# Patient Record
Sex: MALE | Race: BLACK OR AFRICAN AMERICAN | NOT HISPANIC OR LATINO | URBAN - METROPOLITAN AREA
[De-identification: names, ages, dates, MRNs, and addresses within clinical notes are randomized per-mention and may not be internally consistent; named-entity substitution may affect disease eponyms.]

---

## 2023-08-19 ENCOUNTER — HOSPITAL ENCOUNTER (EMERGENCY)
Facility: HOSPITAL | Age: 68
Discharge: HOME | End: 2023-08-20
Attending: EMERGENCY MEDICINE
Payer: MEDICAID

## 2023-08-19 DIAGNOSIS — U07.1 COVID-19: Primary | ICD-10-CM

## 2023-08-19 DIAGNOSIS — I10 HYPERTENSION: ICD-10-CM

## 2023-08-19 PROCEDURE — 96375 TX/PRO/DX INJ NEW DRUG ADDON: CPT | Mod: ER

## 2023-08-19 PROCEDURE — 96374 THER/PROPH/DIAG INJ IV PUSH: CPT | Mod: ER

## 2023-08-19 PROCEDURE — 84484 ASSAY OF TROPONIN QUANT: CPT | Mod: ER

## 2023-08-19 PROCEDURE — 87804 INFLUENZA ASSAY W/OPTIC: CPT | Mod: 59,ER

## 2023-08-19 PROCEDURE — 83880 ASSAY OF NATRIURETIC PEPTIDE: CPT | Mod: ER

## 2023-08-19 PROCEDURE — 85025 COMPLETE CBC W/AUTO DIFF WBC: CPT | Mod: ER

## 2023-08-19 PROCEDURE — 80053 COMPREHEN METABOLIC PANEL: CPT | Mod: ER

## 2023-08-19 PROCEDURE — 25000003 PHARM REV CODE 250: Mod: ER | Performed by: EMERGENCY MEDICINE

## 2023-08-19 PROCEDURE — 99285 EMERGENCY DEPT VISIT HI MDM: CPT | Mod: ER

## 2023-08-19 RX ORDER — ACETAMINOPHEN 500 MG
1000 TABLET ORAL
Status: COMPLETED | OUTPATIENT
Start: 2023-08-19 | End: 2023-08-19

## 2023-08-19 RX ORDER — LABETALOL HYDROCHLORIDE 5 MG/ML
10 INJECTION, SOLUTION INTRAVENOUS
Status: DISCONTINUED | OUTPATIENT
Start: 2023-08-20 | End: 2023-08-20

## 2023-08-19 RX ADMIN — ACETAMINOPHEN 1000 MG: 500 TABLET, FILM COATED ORAL at 11:08

## 2023-08-20 VITALS
DIASTOLIC BLOOD PRESSURE: 79 MMHG | WEIGHT: 170 LBS | OXYGEN SATURATION: 99 % | TEMPERATURE: 98 F | HEART RATE: 62 BPM | SYSTOLIC BLOOD PRESSURE: 161 MMHG | RESPIRATION RATE: 20 BRPM

## 2023-08-20 LAB
ALBUMIN SERPL-MCNC: 3.5 G/DL (ref 3.3–5.5)
ALP SERPL-CCNC: 73 U/L (ref 42–141)
BILIRUB SERPL-MCNC: 0.4 MG/DL (ref 0.2–1.6)
BILIRUBIN, POC UA: NEGATIVE
BLOOD, POC UA: ABNORMAL
BUN SERPL-MCNC: 8 MG/DL (ref 7–22)
CALCIUM SERPL-MCNC: 9.7 MG/DL (ref 8–10.3)
CHLORIDE SERPL-SCNC: 102 MMOL/L (ref 98–108)
CLARITY, POC UA: CLEAR
COLOR, POC UA: YELLOW
CREAT SERPL-MCNC: 0.8 MG/DL (ref 0.6–1.2)
CTP QC/QA: YES
GLUCOSE SERPL-MCNC: 105 MG/DL (ref 73–118)
GLUCOSE, POC UA: NEGATIVE
INFLUENZA A ANTIGEN, POC: NEGATIVE
INFLUENZA B ANTIGEN, POC: NEGATIVE
KETONES, POC UA: NEGATIVE
LEUKOCYTE EST, POC UA: NEGATIVE
NITRITE, POC UA: NEGATIVE
PH UR STRIP: 6.5 [PH]
POC ALT (SGPT): 18 U/L (ref 10–47)
POC AST (SGOT): 30 U/L (ref 11–38)
POC B-TYPE NATRIURETIC PEPTIDE: 53.6 PG/ML (ref 0–100)
POC CARDIAC TROPONIN I: 0 NG/ML (ref 0–0.08)
POC TCO2: 26 MMOL/L (ref 18–33)
POTASSIUM BLD-SCNC: 4.9 MMOL/L (ref 3.6–5.1)
PROTEIN, POC UA: NEGATIVE
PROTEIN, POC: 7.7 G/DL (ref 6.4–8.1)
SAMPLE: NORMAL
SARS-COV-2 RDRP RESP QL NAA+PROBE: POSITIVE
SODIUM BLD-SCNC: 142 MMOL/L (ref 128–145)
SPECIFIC GRAVITY, POC UA: 1.01
UROBILINOGEN, POC UA: 0.2 E.U./DL

## 2023-08-20 PROCEDURE — 93010 ELECTROCARDIOGRAM REPORT: CPT | Mod: ,,, | Performed by: INTERNAL MEDICINE

## 2023-08-20 PROCEDURE — 63600175 PHARM REV CODE 636 W HCPCS: Mod: ER | Performed by: EMERGENCY MEDICINE

## 2023-08-20 PROCEDURE — 87635 SARS-COV-2 COVID-19 AMP PRB: CPT | Mod: ER | Performed by: EMERGENCY MEDICINE

## 2023-08-20 PROCEDURE — 93010 EKG 12-LEAD: ICD-10-PCS | Mod: ,,, | Performed by: INTERNAL MEDICINE

## 2023-08-20 PROCEDURE — 93005 ELECTROCARDIOGRAM TRACING: CPT | Mod: ER

## 2023-08-20 PROCEDURE — 25000003 PHARM REV CODE 250: Mod: ER | Performed by: EMERGENCY MEDICINE

## 2023-08-20 RX ORDER — ONDANSETRON 2 MG/ML
4 INJECTION INTRAMUSCULAR; INTRAVENOUS
Status: COMPLETED | OUTPATIENT
Start: 2023-08-20 | End: 2023-08-20

## 2023-08-20 RX ORDER — BUTALBITAL, ACETAMINOPHEN AND CAFFEINE 50; 325; 40 MG/1; MG/1; MG/1
1 TABLET ORAL
Status: COMPLETED | OUTPATIENT
Start: 2023-08-20 | End: 2023-08-20

## 2023-08-20 RX ORDER — SODIUM CHLORIDE 9 MG/ML
500 INJECTION, SOLUTION INTRAVENOUS
Status: COMPLETED | OUTPATIENT
Start: 2023-08-20 | End: 2023-08-20

## 2023-08-20 RX ORDER — IBUPROFEN 600 MG/1
600 TABLET ORAL EVERY 6 HOURS PRN
Qty: 20 TABLET | Refills: 0 | Status: SHIPPED | OUTPATIENT
Start: 2023-08-20

## 2023-08-20 RX ORDER — KETOROLAC TROMETHAMINE 30 MG/ML
15 INJECTION, SOLUTION INTRAMUSCULAR; INTRAVENOUS
Status: COMPLETED | OUTPATIENT
Start: 2023-08-20 | End: 2023-08-20

## 2023-08-20 RX ORDER — AZITHROMYCIN 250 MG/1
250 TABLET, FILM COATED ORAL DAILY
Qty: 6 TABLET | Refills: 0 | Status: SHIPPED | OUTPATIENT
Start: 2023-08-20

## 2023-08-20 RX ORDER — AMOXICILLIN AND CLAVULANATE POTASSIUM 875; 125 MG/1; MG/1
1 TABLET, FILM COATED ORAL 2 TIMES DAILY
Qty: 14 TABLET | Refills: 0 | Status: SHIPPED | OUTPATIENT
Start: 2023-08-20

## 2023-08-20 RX ORDER — FAMOTIDINE 10 MG/ML
20 INJECTION INTRAVENOUS
Status: COMPLETED | OUTPATIENT
Start: 2023-08-20 | End: 2023-08-20

## 2023-08-20 RX ORDER — ACETAMINOPHEN 500 MG
1000 TABLET ORAL EVERY 8 HOURS PRN
Qty: 30 TABLET | Refills: 0 | Status: SHIPPED | OUTPATIENT
Start: 2023-08-20

## 2023-08-20 RX ADMIN — FAMOTIDINE 20 MG: 10 INJECTION INTRAVENOUS at 12:08

## 2023-08-20 RX ADMIN — ONDANSETRON 4 MG: 2 INJECTION INTRAMUSCULAR; INTRAVENOUS at 12:08

## 2023-08-20 RX ADMIN — SODIUM CHLORIDE 500 ML: 9 INJECTION, SOLUTION INTRAVENOUS at 02:08

## 2023-08-20 RX ADMIN — BUTALBITAL, ACETAMINOPHEN, AND CAFFEINE 1 TABLET: 325; 50; 40 TABLET ORAL at 01:08

## 2023-08-20 RX ADMIN — KETOROLAC TROMETHAMINE 15 MG: 30 INJECTION, SOLUTION INTRAMUSCULAR; INTRAVENOUS at 12:08

## 2023-08-20 NOTE — DISCHARGE INSTRUCTIONS
You have COVID.  Complete the course of Paxlovid prescribed.  Use ibuprofen or Tylenol as needed for fever or body aches.  Continue your blood pressure medication as you have been prescribed.  You have been prescribed a course of antibiotics to cover for pneumonia as well.  Schedule close follow-up with your primary physician.  Return to the emergency department for significant breathing difficulty, inability to drink oral fluids, or any new, worsening or significantly concerning symptoms.    Thank you for coming to our Emergency Department today. It is important to remember that some problems are difficult to diagnose and may not be found during your first visit. Be sure to follow up with your primary care doctor and review any labs/imaging that was performed with them. If you do not have a primary care doctor, you may contact the one listed on your discharge paperwork or you may also call the [facility] Appointment Desk at [phone] to schedule an appointment with one.     All medications may potentially have side effects and it is impossible to predict which medications may give you side effects. If you feel that you are having a negative effect of any medication you should immediately stop taking them and seek medical attention.    Return to the ER with any questions/concerns, new/concerning symptoms, worsening or failure to improve. Do not drive or make any important decisions for 24 hours if you have received any pain medications, sedatives or mood altering drugs during your ER visit.

## 2023-11-29 ENCOUNTER — HOSPITAL ENCOUNTER (EMERGENCY)
Facility: HOSPITAL | Age: 68
Discharge: HOME | End: 2023-11-30
Attending: STUDENT IN AN ORGANIZED HEALTH CARE EDUCATION/TRAINING PROGRAM
Payer: MEDICAID

## 2023-11-29 DIAGNOSIS — I10 HYPERTENSION, UNSPECIFIED TYPE: Primary | ICD-10-CM

## 2023-11-29 DIAGNOSIS — R51.9 NONINTRACTABLE HEADACHE, UNSPECIFIED CHRONICITY PATTERN, UNSPECIFIED HEADACHE TYPE: ICD-10-CM

## 2023-11-29 PROCEDURE — 99283 EMERGENCY DEPT VISIT LOW MDM: CPT

## 2023-11-29 PROCEDURE — 25000003 PHARM REV CODE 250: Performed by: STUDENT IN AN ORGANIZED HEALTH CARE EDUCATION/TRAINING PROGRAM

## 2023-11-29 RX ORDER — ACETAMINOPHEN 500 MG
1000 TABLET ORAL
Status: COMPLETED | OUTPATIENT
Start: 2023-11-29 | End: 2023-11-29

## 2023-11-29 RX ORDER — HYDRALAZINE HYDROCHLORIDE 25 MG/1
25 TABLET, FILM COATED ORAL
Status: COMPLETED | OUTPATIENT
Start: 2023-11-29 | End: 2023-11-29

## 2023-11-29 RX ADMIN — HYDRALAZINE HYDROCHLORIDE 25 MG: 25 TABLET, FILM COATED ORAL at 11:11

## 2023-11-29 RX ADMIN — ACETAMINOPHEN 1000 MG: 500 TABLET ORAL at 11:11

## 2023-11-30 VITALS
TEMPERATURE: 98 F | HEIGHT: 63 IN | OXYGEN SATURATION: 98 % | HEART RATE: 67 BPM | WEIGHT: 165 LBS | SYSTOLIC BLOOD PRESSURE: 122 MMHG | DIASTOLIC BLOOD PRESSURE: 61 MMHG | BODY MASS INDEX: 29.23 KG/M2 | RESPIRATION RATE: 17 BRPM

## 2023-11-30 NOTE — ED PROVIDER NOTES
Encounter Date: 11/29/2023       History     Chief Complaint   Patient presents with    Hypertension     High BP at home 190/94. Also co back pain.      68-year-old male presents for evaluation of elevated blood pressure at home, systolic of 190/94 associated with frontal headache and bilateral upper neck pain.  He does not know what medication he takes for his blood pressure.  He often gets headaches when his blood pressure is elevated.        Review of patient's allergies indicates:  No Known Allergies  Past Medical History:   Diagnosis Date    Fibroids     Hypertension      No past surgical history on file.  No family history on file.     Review of Systems   Constitutional:  Negative for activity change and appetite change.   HENT:  Negative for congestion and drooling.    Eyes:  Negative for discharge and itching.   Respiratory:  Negative for cough and chest tightness.    Cardiovascular:  Negative for chest pain and leg swelling.   Gastrointestinal:  Negative for abdominal distention and abdominal pain.   Genitourinary:  Negative for difficulty urinating and dysuria.   Musculoskeletal:  Positive for arthralgias.   Skin:  Negative for color change and pallor.   Neurological:  Positive for headaches. Negative for dizziness and facial asymmetry.   Psychiatric/Behavioral:  Negative for agitation and behavioral problems.        Physical Exam     Initial Vitals [11/29/23 2221]   BP Pulse Resp Temp SpO2   (!) 179/84 70 18 97.9 °F (36.6 °C) 98 %      MAP       --         Physical Exam    Nursing note and vitals reviewed.  Constitutional: He appears well-developed and well-nourished. He is not diaphoretic.   HENT:   Head: Normocephalic and atraumatic.   Mouth/Throat: Oropharynx is clear and moist.   Eyes: Conjunctivae and EOM are normal. Pupils are equal, round, and reactive to light. Right eye exhibits no discharge. Left eye exhibits no discharge.   Neck: No thyromegaly present. No tracheal deviation present.   Normal  range of motion.  Cardiovascular:  Normal rate, regular rhythm and intact distal pulses.           Pulmonary/Chest: Breath sounds normal. No respiratory distress. He has no wheezes. He exhibits no tenderness.   Abdominal: Abdomen is soft. Bowel sounds are normal. He exhibits no distension. There is no abdominal tenderness.   Musculoskeletal:         General: No tenderness or edema. Normal range of motion.      Cervical back: Normal range of motion.     Neurological: He is alert and oriented to person, place, and time. He has normal strength. No cranial nerve deficit or sensory deficit. GCS eye subscore is 4. GCS verbal subscore is 5. GCS motor subscore is 6.   Skin: Skin is warm and dry. No rash noted.   Psychiatric: He has a normal mood and affect. His behavior is normal. Thought content normal.         ED Course   Procedures  Labs Reviewed - No data to display       Imaging Results    None          Medications   acetaminophen tablet 1,000 mg (1,000 mg Oral Given 11/29/23 2311)   hydrALAZINE tablet 25 mg (25 mg Oral Given 11/29/23 2311)     Medical Decision Making  Differential diagnosis includes primary headache (tension/cluster/migraine), subarachnoid hemorrhage/ruptured aneurysm, intracranial lesion/mass, meningitis/encephalitis, hypertensive encephalopathy, head trauma/contusion, concussion, sinus headache, dehydration, anxiety, medication non-compliance, medication overuse      Risk  OTC drugs.  Prescription drug management.               ED Course as of 11/30/23 0128   Thu Nov 30, 2023   0127 After complete evaluation, including thorough history and physical exam, the patient's symptoms are most likely due to primary headache syndrome.  Hypertensive but blood pressure improved with headache control and also hydralazine. The patient's headaches are similar in character to prior. The history does not suggest sudden/maximal onset of pain consistent with SAH/intracranial bleed. Physical exam is benign without  focal weakness, sensory deficit, or cerebellar signs to suggest stroke or intracranial mass. There is no meningismus, fever, or evidence of infection to suggest meningitis/encephalitis. The patient was treated with supportive care  and improved.  Stable for discharge with outpatient follow-up and return precautions.   [BS]      ED Course User Index  [BS] Herve Huang MD                           Clinical Impression:  Final diagnoses:  [I10] Hypertension, unspecified type (Primary)  [R51.9] Nonintractable headache, unspecified chronicity pattern, unspecified headache type          ED Disposition Condition    Discharge Stable          ED Prescriptions    None       Follow-up Information       Follow up With Specialties Details Why Contact Info    [facility] -  Call today To set up a follow-up appointment, To recheck today's symptoms [address]  [place] [id number]  [phone]               Herve Huang MD  11/30/23 0128

## 2023-11-30 NOTE — DISCHARGE INSTRUCTIONS
